# Patient Record
Sex: FEMALE | Race: WHITE
[De-identification: names, ages, dates, MRNs, and addresses within clinical notes are randomized per-mention and may not be internally consistent; named-entity substitution may affect disease eponyms.]

---

## 2017-01-03 ENCOUNTER — HOSPITAL ENCOUNTER (INPATIENT)
Dept: HOSPITAL 65 - ER | Age: 57
LOS: 3 days | Discharge: HOME | DRG: 419 | End: 2017-01-06
Attending: INTERNAL MEDICINE | Admitting: INTERNAL MEDICINE
Payer: COMMERCIAL

## 2017-01-03 VITALS — SYSTOLIC BLOOD PRESSURE: 121 MMHG | DIASTOLIC BLOOD PRESSURE: 77 MMHG

## 2017-01-03 VITALS — BODY MASS INDEX: 20.06 KG/M2 | HEIGHT: 64 IN | WEIGHT: 117.5 LBS

## 2017-01-03 DIAGNOSIS — E78.00: ICD-10-CM

## 2017-01-03 DIAGNOSIS — K29.70: ICD-10-CM

## 2017-01-03 DIAGNOSIS — Z80.0: ICD-10-CM

## 2017-01-03 DIAGNOSIS — K21.9: ICD-10-CM

## 2017-01-03 DIAGNOSIS — K80.10: Primary | ICD-10-CM

## 2017-01-03 DIAGNOSIS — F17.210: ICD-10-CM

## 2017-01-03 DIAGNOSIS — M54.9: ICD-10-CM

## 2017-01-03 DIAGNOSIS — Z79.899: ICD-10-CM

## 2017-01-03 DIAGNOSIS — E83.42: ICD-10-CM

## 2017-01-03 DIAGNOSIS — E87.6: ICD-10-CM

## 2017-01-03 DIAGNOSIS — Z78.0: ICD-10-CM

## 2017-01-03 DIAGNOSIS — I10: ICD-10-CM

## 2017-01-03 DIAGNOSIS — Z82.49: ICD-10-CM

## 2017-01-03 LAB
ALP INTEST CFR SERPL: 93 U/L (ref 50–136)
ALT SERPL-CCNC: 41 U/L (ref 12–78)
APPEARANCE UR: CLEAR
AST SERPL-CCNC: 76 U/L (ref 0–35)
BASOPHILS # BLD AUTO: 0 10^3/UL (ref 0–0.1)
BASOPHILS NFR BLD AUTO: 0.2 % (ref 0–0.2)
BILIRUB UR STRIP.AUTO-MCNC: NEGATIVE MG/DL
CALCIUM SERPL-MCNC: 8.2 MG/DL (ref 8.4–10.5)
CO2 BLDA-SCNC: 28.7 MMOL/L (ref 20–32)
COLOR UR: YELLOW
EOSINOPHIL # BLD AUTO: 0.1 10^3/UL (ref 0–0.2)
EOSINOPHIL NFR BLD AUTO: 0.8 % (ref 0–5)
ERYTHROCYTE [DISTWIDTH] IN BLOOD BY AUTOMATED COUNT: 12.8 % (ref 11.5–14.5)
GLUCOSE PRE 100 G GLC PO SERPL-MCNC: 139 MG/DL (ref 70–110)
HGB BLD-MCNC: 12.9 G/DL (ref 12–15)
LYMPHOCYTES # BLD AUTO: 1.3 10^3/UL (ref 1–4.8)
LYMPHOCYTES NFR BLD AUTO: 12.5 % (ref 24–44)
MCH RBC QN AUTO: 31.9 PG (ref 26–34)
MCHC RBC AUTO-ENTMCNC: 34.3 G/DL (ref 33–37)
MCV RBC AUTO: 92.8 FL (ref 78–100)
MONOCYTES # BLD AUTO: 1 10^3/UL (ref 0.3–0.8)
MONOCYTES NFR BLD AUTO: 9.3 % (ref 5–12)
NEUTROPHILS # BLD AUTO: 8.2 10^3/UL (ref 1.8–7.7)
NEUTROPHILS NFR BLD AUTO: 77.1 % (ref 41–85)
PLATELET # BLD AUTO: 271 10^3/UL (ref 150–400)
PMV BLD AUTO: 10 FL (ref 7.8–11)
UROBILINOGEN UR QL STRIP.AUTO: NORMAL
WBC # BLD AUTO: 10.6 10^3/UL (ref 4.5–11)
WBC,URINE: (no result) WBC/HPF (ref 0–2)

## 2017-01-03 PROCEDURE — 93307 TTE W/O DOPPLER COMPLETE: CPT

## 2017-01-03 PROCEDURE — 83880 ASSAY OF NATRIURETIC PEPTIDE: CPT

## 2017-01-03 PROCEDURE — 81000 URINALYSIS NONAUTO W/SCOPE: CPT

## 2017-01-03 PROCEDURE — 99285 EMERGENCY DEPT VISIT HI MDM: CPT

## 2017-01-03 PROCEDURE — 86677 HELICOBACTER PYLORI ANTIBODY: CPT

## 2017-01-03 PROCEDURE — G0378 HOSPITAL OBSERVATION PER HR: HCPCS

## 2017-01-03 PROCEDURE — 83735 ASSAY OF MAGNESIUM: CPT

## 2017-01-03 PROCEDURE — 85025 COMPLETE CBC W/AUTO DIFF WBC: CPT

## 2017-01-03 PROCEDURE — 80053 COMPREHEN METABOLIC PANEL: CPT

## 2017-01-03 PROCEDURE — 36415 COLL VENOUS BLD VENIPUNCTURE: CPT

## 2017-01-03 PROCEDURE — 84484 ASSAY OF TROPONIN QUANT: CPT

## 2017-01-03 PROCEDURE — 80048 BASIC METABOLIC PNL TOTAL CA: CPT

## 2017-01-03 PROCEDURE — 43239 EGD BIOPSY SINGLE/MULTIPLE: CPT

## 2017-01-03 PROCEDURE — 85730 THROMBOPLASTIN TIME PARTIAL: CPT

## 2017-01-03 PROCEDURE — 83690 ASSAY OF LIPASE: CPT

## 2017-01-03 PROCEDURE — 76705 ECHO EXAM OF ABDOMEN: CPT

## 2017-01-03 PROCEDURE — 96374 THER/PROPH/DIAG INJ IV PUSH: CPT

## 2017-01-03 PROCEDURE — 74176 CT ABD & PELVIS W/O CONTRAST: CPT

## 2017-01-03 PROCEDURE — 85610 PROTHROMBIN TIME: CPT

## 2017-01-03 PROCEDURE — 82550 ASSAY OF CK (CPK): CPT

## 2017-01-03 PROCEDURE — 93005 ELECTROCARDIOGRAM TRACING: CPT

## 2017-01-03 RX ADMIN — POTASSIUM CHLORIDE AND SODIUM CHLORIDE SCH MLS/HR: 450; 150 INJECTION, SOLUTION INTRAVENOUS at 22:50

## 2017-01-03 NOTE — PRM.ACF1
Admission Criteria Forms


                                                                         


                           ABDOMINAL PAIN





Clinical Indications for Admission to Inpatient Care


 


                                                                               

      (Place 'X' for any and all applicable criteria):





Admission is indicated for ANY ONE of the following(1)(2)(3)(4)(5):


[X]I.      Inpatient admission required rather than observation care (Also use 

Abdominal Pain: Observation Care, 


           as appropriate) because of ANY ONE of the following:


            [ ]a)   Severe pain requiring acute inpatient management


            [ ]b)    Identification of etiology/finding that requires inpatient 

care (eg, aortic dissection, free air)


            [ ]c)    Absent bowel sounds with complete ileus(6)  


            [ ]d)    Suspected toxic megacolon


            [X]e)    Severe electrolyte abnormalities requiring inpatient care


            [ ]f)     High fever or infection requiring inpatient admission as 

indicated by ANY ONE of following(7)(8):


                       [ ] i)    Appropriate outpatient or observational care 

antimicrobial treatment unavailable, not effective, or not feasible


                       [ ] ii)   Documented bacteremia 


                       [ ] iii)  Temperature > 104.9 degrees F (oral)


                       [ ] iv)  T >103.1 F (oral) or < 96.8 F(rectal) that does 

not respond to all emergency treatment measures


            [ ]g)     Signs of intestinal obstruction [B] 


            [ ]h)     Hemodynamic instability


            [ ]i)      IV fluid to replace significant ongoing losses (greater 

than 3 L/m2 per day) (12)(13)


            [ ]j)      Percutaneous or open drainage (eg, abscess, biliary tract

) procedures


            [ ]k)     Parenteral nutrition regimen that must be implemented on 

inpatient basis   


            [ ]l)      Other condition,treatment or monitoring requiring 

inpatient admission.


[ ]II.      Peritoneal signs present


[ ]III.     Surgery needed that cannot be performed on an ambulatory basis.


[ ]IV.    Evaluation requires patient to not eat or drink for extended period (

eg, more than 24 hours).





[ ]V.     Contraindications and/or Inappropriate clinical situations for 

Observational Care in patients with


           abdominal pain, when ANY ONE of the following is required:


           [ ]a)  Thorough evaluation is required to prevent catastrophic 

events due to delays in diagnosing  


                   (e.g.Mesenteric ischemia) 1,3


           [ ]b)  Patient with severe pathology or with chronic symptoms 

unlikely to improve in the ED stay (3)


[ ]VI.    General contraindications and/or Inappropriate clinical situations 

for Observational Care in patients


           with abdominal pain, when ANY ONE of the following is required:


           [ ]a)   Prediction of prolongation of LOS based on ANY ONE of the 

following may be considered as 


                    a contraindication for observational care 2, 3, 4, 5, 6, 7, 

8, 9, 10, 11


                    [ ]i)    Age > 65 yrs.


                    [ ]ii)   Patient arriving by ambulance


                    [ ]iii)  Patient with high acuity


                    [ ]iv)  Patient requiring vital sign monitoring


                    [ ]v)   Patient on IV medication


           [ ]b)   Systolic blood pressures  180mmHg 3,12


           [ ]c)   Patient with altered mental status including delirium and 

other alteration of consciousness, (3)


           [ ]d)   Patient whose discharge disposition will be to a skilled 

nursing home or rehabilitation home should 


                    not be managed in Emergency Department Observation Unit. 

CMS rule requires 3 days hospital stay before such placement.3,13


           [ ]e)   Patient with failure to thrive due to broad array of 

etiologies 3,16,17


           [ ]f)    Inability to ambulate 3,14








Extended stay beyond goal length of stay may be needed for(2)(3):


[ ]a)   Persistent abdominal pain with suspected intra-abdominal process


[ ]b)   Diagnosed condition requiring continued stay (e.g., pancreatitis, 

complicated diverticulitis)                                            


[ ]c)   Surgery (e.g., colectomy)                


    





The original [facility] content created by [email or web address] has been revised. 


The portions of the content which have been revised are identified through the 

use of italic text or in bold, and [facility] 


has neither reviewed nor approved the modified material.All other unmodified 

content is copyright  [facility].





Please see references footnoted in the original [facility] edition 

2016








AJIT HAYWOOD CDS Aleksandr 3, 2017 17:00


ADILENE BELLO MD Aleksandr 3, 2017 17:27

## 2017-01-03 NOTE — ER.PDOC
General


Chief Complaint:  Abdomen Pain


Stated Complaint:  ABD PAIN


Time seen by MD:  15:40


Source:  patient


Exam Limitations:  no limitations





History of Present Illness


Initial Comments


Mid epigastric pain associated with N/V/D for 5 days. Saw urgent care this 

weekend and was given Zofran which has been helpful.


Timing/Duration:  intermittent, other (5 days)


Severity/Quality:  mild, cramping


Radiation:  back


Associated Symptoms:  back pain, diarrhea, heartburn, nausea/vomiting


Exacerbated by:  food


Relieved By:  nothing


Allergies:  


Coded Allergies:  


     No Known Allergies (Unverified , 1/3/17)


Home Meds


Reported Medications


Esomeprazole Magnesium (Nexium)40 Mg Capsule.dr1 Cap PO DAILY #90 CAP  Ref 3


   1/3/17


Estrogens, Conjugated (Premarin)1.25 Mg Tablet1 Tab PO DAILY #90 TAB  Ref 3


   1/3/17


Losartan/Hydrochlorothiazide (Losartan-Hctz 50-12.5 Mg Tab)1 Each Tablet1 Tab 

PO DAILY #90 TAB  Ref 3


   1/3/17


Vital Signs





First Vital Signs








  Date Time  Temp Pulse Resp B/P Pulse Ox O2 Delivery O2 Flow Rate FiO2


 


1/3/17 15:10 98.7 80 20  99   


 


1/3/17 15:14    120/77    








Last Vital Signs








  Date Time  Temp Pulse Resp B/P Pulse Ox O2 Delivery O2 Flow Rate FiO2


 


1/3/17 15:14 98.7 80 20 120/77 99   











Past Medical History


Medical History:  high cholesterol


Surgical History:  no surgical history


LMP (females 10-50):  postmenopause





Family History


Significant Family History:  heart disease (Maternal aunt and uncle), cancer (

Pancreatic in father)





Social History


Smoking:  cigarettes, greater than 1 pack/day


Alcohol Use:  none


Drug Use:  none





Constitutional:   no symptoms reported


EENTM:   no symptoms reported


Respiratory:   no symptoms reported


Cardiovascular:   no symptoms reported


Gastrointestinal:   abdominal pain diarrhea nausea poor appetite vomiting


Genitourinary:   no symptoms reported


Musculoskeletal:   back pain


Skin:   no symptoms reported


Psychiatric/Neurological:   no symptoms reported


Endocrine:   no symptoms reported


Hematologic/Lymphatic:   no symptoms reported


All Other Systems:  Reviewed and Negative





Physical Exam


General Appearance:  WD/WN, Mild Distress, Thin


HEENT:  PERRL/EOMI


Neck:  Non-Tender, Full Range of Motion, Supple, Normal Inspection


Respiratory:  chest non-tender, lungs clear, normal breath sounds, no 

respiratory distress


Cardiovascular:  Normal Peripheral Pulses, Regular Rate, Rhythm, No Edema, No 

Gallop, No JVD, No Murmur


Gastrointestinal:  Normal Bowel Sounds, Soft, Tenderness (mid epigastric)


Back:  Normal Inspection, No CVA Tenderness


Extremities:  Normal Range of Motion, Non-Tender, Normal Inspection, No Pedal 

Edema, No Calf Tenderness, Normal Capillary Refill


Neurologic/Psychiatric:  CNs II-XII NML as Tested, No Motor/Sensory Deficits, 

Alert, Normal Mood/Affect, Oriented x 3


Skin:  Normal Color, Warm/Dry, Cyanosis





Progress


Progress


Abdominal pain continues, but 1/10 and patient refusing any need for pain 

medication. Understands the need for admission due to hypokalemia.





EKG/XRAY/CT/US


EKG:  NSR


CT Comments:  Abdomen: gallbladder sludge, distended stomach





Consult/PCP


Time Consult/PCP Called:  17:05


Consult/PCP:  Dr. Moses


Reason/Comments:  Admission Request, accepted admission





Course


Blood Pressure Systolic:  120


Blood Pressure Diastolic:  77


Blood Pressure Mean:  91





Departure


Time of Disposition:  17:09


Disposition:  09 ADMITTED AS INPATIENT


Impression:  


 Primary Impression:  


 Hypokalemia, gastrointestinal losses


 Additional Impression:  


 Acute gastroenteritis


Condition:  Stable


Referrals:  


MK RICHARDSON MD (PCP)


PRIMARY CARE PROVIDER





Additional Instructions:


Will be admitted to Dr. Moses under obs.





Problem Qualifiers








TOMI BURCH MD Aleksandr 3, 2017 15:48


ADILENE BELLO MD Aleksandr 3, 2017 17:18

## 2017-01-03 NOTE — DIREP
PROCEDURE:CT ABD/PELVIS WITHOUT CONTRAST

 

TECHNIQUE:No oral contrast was given.  Axial cuts were obtained from the dome 

of the diaphragm to the ischial tuberosities.  No intravenous contrast was 

given.  The images were viewed at lung and soft tissue settings.  Sagittal and 

coronal reconstructions are provided.  

 

COMPARISON:None.

 

INDICATIONS:Epigastric Abdominal Pain

 

FINDINGS:

LOWER CHEST:No infiltrate or pleural effusion.

LIVER:Normal.

BILIARY:Distended gallbladder with tiny layering stones or sludge.  No biliary 

duct dilatation.

PANCREAS:Normal.

SPLEEN:Normal.

URINARY TRACT:No renal or ureteral stone, obstruction or perinephric 

inflammation.  Unremarkable urinary bladder.

ADRENALS:Normal.

AORTA/VASCULAR:Aortoiliac calcification without dilatation.

RETROPERITONEUM:Normal.

BOWEL/MESENTERY:Large volume of fluid and food material in the stomach.  No 

obvious mass, inflammatory stranding, free fluid or air.  Nonvisualized 

appendix.

ABDOMINAL WALL:Normal.

PELVIS:Hysterectomy.

BONES:L5/S1 disc degenerative changes.

OTHER:Normal.

 

CONCLUSION:

1.  Distended gallbladder containing layering gravel or sludge.

2.  Prominent gastric distention may indicate gastroparesis or outlet 

obstruction.

 

 

 

Dictated by: Leela De La Torre MD on 01/03/2017 at 04:12 PM     

Electronically Signed By: Leela De La Torre MD on 01/03/2017 at 04:18 PM

## 2017-01-03 NOTE — NUR
ARRIVAL

PATIENT ARRIVED TO ED4 AMBULATORY, C/O OF EPIGASTRIC PAIN TODAY, WAS SEEN 
RECENTLY IN THE URGENT CARE FOR NAUSEA AND VOMITING, SENT HOME WITH ZOFRAN, DID 
TAKE IT FOR A COUPLE OF DAY STARTED TO FEEL BETTER, BUT EPIGASTRIC PAIN 
CONTINUED AFTER EATING STEW. DECIDED TO TAKE ZOFRAN AND COME TO ED FOR FURTHER 
EVAL BY EDP. NO DISTRESS NOTED.

## 2017-01-03 NOTE — HPH
ADMIT DATE:  01/03/2017



The patient is being placed under observation to Med-Surg.



PRIMARY CARE PHYSICIAN:  Bc Calhoun MD



ADMITTING DIAGNOSES:

1. Nausea, vomiting with right upper quadrant and epigastric pains and loose

stools.

2. Hypokalemia with hypomagnesemia.

3. Hypertension with GERD and postmenopausal.



CHIEF COMPLAINT:  "I threw up and had loose stools and had belly pain."



HISTORY OF PRESENT ILLNESS:  The patient is a very pleasant 56-year-old female

with known hypertension and GERD who started off waking up Friday, which was

approximately 4 days ago with nausea and nonbilious vomiting.  She had vomited

up her food the night before.  She cannot recall what she ate on Thursday night

though and this progressed throughout Friday to the point where she was having 3

episodes of loose stool.  She took some Tums and over-the-counter antacids.  She

took her Nexium that normally helps her with her reflux and the pain subsided

throughout the day.  The next day she did fine and the following day she did

fine up until yesterday when she started to have increasing nausea and

nonbilious vomiting again.  No diarrhea.  No loose stools reported at this time.

 She had increasing pains in her upper abdomen and this is what prompted her to

go to the ER today.  No recent travel noted.  She has not eaten anything out of

the ordinary.  She did remark that 2 days ago that she ate some pot stew and

after that she felt very nauseated, had abdominal pains, so both times it was

after she was eating.  She denies any fevers, no night sweats, no syncope, no

lethargy.  No dysuria.  No trauma reported.



PAST MEDICAL HISTORY:  Hypertension for about 4 years, GERD for many years and

she is postmenopausal.



PAST SURGICAL HISTORY:  She had a scope done 20 years ago and said that she had

esophagitis at that time.



ALLERGIES:  NO KNOWN DRUG ALLERGIES.



MEDICATIONS:  She is on include Nexium, Premarin and losartan/HCT, 50/12.5 mg

daily.



SOCIAL HISTORY:  She smokes about a pack a day.  No illicit drugs, no alcohol

reported.



FAMILY HISTORY:  Asked and noncontributory for this admission.



PHYSICAL EXAMINATION:

VITAL SIGNS:  When she came in, temperature is 98.7, pulse rate 80, respirations

20, blood pressure 120/77 and O2 sats of 99% on room air.

My physical exam is as follows:

GENERAL:  She is in no acute distress to me, awake and alert, oriented x 4.

HEENT:  Oropharynx is clear.  Moist mucous membranes noted.

NECK:  Supple, no JVD, no bruits.

HEART:  S1, S2 audible.  She was not tachycardic.

LUNGS:  Clear bilaterally.  She is not tachypneic.

ABDOMEN:  Bowel sounds are present.  She was tender to the right upper quadrant

and epigastrium.  No rebound, no guarding noted.  No masses felt.  No CVA

tenderness on her back.  No edema to her lower extremities.  No petechia, no

purpura.  2+ distal pulses are noted.



LABORATORY DATA:  Labs were drawn.  CBC was normal.  Coags were normal.  Urine

was normal.  H. pylori was negative.  Chemistry panel showed potassium 2.5, mag

of 1.7, AST is 76.  CK was elevated to 326, CK-MB elevated at 7.2, but troponin

I was less than 0.02.  Lipase was normal.



IMAGING STUDIES:  She had a CAT scan of the abdomen and pelvis without contrast

that just showed distended gallbladder containing _____ sludge, prominent

gastric distention.



ASSESSMENT:  We have this female with nausea, vomiting with loose stools, right

upper quadrant pain and epigastric pain with underlying hypertension.  EKG shows

some LV strain, but she is not having any cardiac symptoms currently.  I am

concerned about a gastritis picture versus peptic ulcer disease versus

gallbladder disease.  I will continue her home medications, which includes PPI

for stomach.  I will get a right upper quadrant ultrasound to look at the

gallbladder tomorrow and get an echo to look at the heart as well.  I will draw

some serial cardiac enzymes on her and see how she will do by tomorrow.





______________________________

Aura Moses MD



DR:  TETE/melina  JOB# [id number]  [id number]

DD:  01/03/2017 19:28  DT:  01/03/2017 21:57

## 2017-01-04 VITALS — DIASTOLIC BLOOD PRESSURE: 93 MMHG | SYSTOLIC BLOOD PRESSURE: 141 MMHG

## 2017-01-04 VITALS — SYSTOLIC BLOOD PRESSURE: 140 MMHG | DIASTOLIC BLOOD PRESSURE: 96 MMHG

## 2017-01-04 VITALS — DIASTOLIC BLOOD PRESSURE: 82 MMHG | SYSTOLIC BLOOD PRESSURE: 133 MMHG

## 2017-01-04 VITALS — SYSTOLIC BLOOD PRESSURE: 130 MMHG | DIASTOLIC BLOOD PRESSURE: 88 MMHG

## 2017-01-04 VITALS — DIASTOLIC BLOOD PRESSURE: 78 MMHG | SYSTOLIC BLOOD PRESSURE: 129 MMHG

## 2017-01-04 LAB
CALCIUM SERPL-MCNC: 7.9 MG/DL (ref 8.4–10.5)
CO2 BLDA-SCNC: 28 MMOL/L (ref 20–32)
GLUCOSE PRE 100 G GLC PO SERPL-MCNC: 97 MG/DL (ref 70–110)

## 2017-01-04 RX ADMIN — POTASSIUM CHLORIDE AND SODIUM CHLORIDE SCH MLS/HR: 450; 150 INJECTION, SOLUTION INTRAVENOUS at 21:39

## 2017-01-04 RX ADMIN — LOSARTAN POTASSIUM SCH MG: 50 TABLET, FILM COATED ORAL at 16:32

## 2017-01-04 RX ADMIN — PANTOPRAZOLE SODIUM SCH MG: 40 TABLET, DELAYED RELEASE ORAL at 09:00

## 2017-01-04 RX ADMIN — ESTROGENS, CONJUGATED SCH MG: 0.62 TABLET, FILM COATED ORAL at 09:00

## 2017-01-04 RX ADMIN — LOSARTAN POTASSIUM SCH MG: 50 TABLET, FILM COATED ORAL at 09:00

## 2017-01-04 RX ADMIN — HYDROCHLOROTHIAZIDE SCH MG: 12.5 CAPSULE ORAL at 09:00

## 2017-01-04 RX ADMIN — POTASSIUM CHLORIDE AND SODIUM CHLORIDE SCH MLS/HR: 450; 150 INJECTION, SOLUTION INTRAVENOUS at 10:51

## 2017-01-04 NOTE — NUR
REPORT

REPORT RECEIVED ON PT. THIS NURSE ASSUMED CARE AT THIS TIME. PT CONTINUES TO BE NPO, 
AWAITING GALLBLADDER SONO.

## 2017-01-04 NOTE — NUR
DISCHARGE PLAN

VISITED WITH PATIENT AND FAMILY FRIEND CONCERNING DISCHARGE PLAN AND NEED. PATIENT STATED 
SHE LIVES @ HOME WITH , IS INDEPENDENT ON ADLS AND DRIVES DAILY. CM ADDRESSED PATIENT 
SAFETY HANDOUT, NO QUESTIONS ASKED AND VOICED UNDERSTANDING. PATIENT VOICED CONCERN 
REGARDING TIME OF GALLBLADDER US VERSES NPO STATUS. REINFORCEMENT EDUCATION GIVEN TO PATIENT 
REGARDING HER GALLBLADDER US VERSES NPO STATUS WITH VERBAL UNDERSTANDING AND TEACH BACK BY 
PATIENT. CM ALSO VERIFIED TIME WITH US DEPARTMENT, STATED PATIENT WAS NEXT. CM INFORMED 
PATIENT WITH VERBAL UNDERSTANDING, STATED "OKAY THANK YOU FOR CHECKING I WOULD JUST REALLY 
LIKE A CUP OF COFFEE, MY NURSE PROMISED COFFEE AS SOON AS I WAS DONE WITH MY US." BETHEL HOGUE RN CHARGE NURSE NOTIFIED OF PATIENTS COFFEE REQUEST S/P US. CURRENT GOAL FOR PATIENT IS TO 
RETURN HOME WITH  TO ROUTINE SELF CARE UPON DISCHARGE. CONTACT INFORMATION PROVIDED. 
NO FURTHER CM OR DISCHARGE NEEDS KNOWN @ THIS TIME.

## 2017-01-04 NOTE — DIREP
PROCEDURE:US ABDOMEN LIMITED

 

COMPARISON:None.

 

INDICATIONS:RUQ pain with N/V, DISTENDED GB

 

FINDINGS:

 

PANCREAS:Partially obscured by overlying bowel gas

LIVER:Normal hepatic parenchymal architecture.   Normal, hepatopetal flow in 

the portal vein.  

GALLBLADDER:Echogenic material inferior with sludge and shadowing stones. 

Gallbladder wall measures 4 mm

BILIARY:4 mm common bile duct.  There is no biliary ductal dilatation.  

RIGHT KIDNEY:11.2 cm.  Normal. No hydronephrosis.  

OTHER:Negative.  No ascites is identified.  

 

CONCLUSION:

1. Cholelithiasis. Wall thickening suggest cholecystitis.

 

 

 

 

Dictated by: Mariusz Glasgow Jr. on 01/04/2017 at 12:21 PM     

Electronically Signed By: Mariusz Glasgow Jr. on 01/04/2017 at 12:25 PM

## 2017-01-04 NOTE — NUR
Assessment completed as charted, see flowsheet for further notes. Pt up ambulating hallway 
frequently. Pt denies c/o pain. Does verbalize some concern over impending surgery in AM, 
but appears overall comfortable with what will happen. Shortly discussed this with patient 
during this time. Spouse at bedside. Pt denies further needs at this time. States she does 
want an HS snack before MN after which she will be NPO. Snack will be provided. Will 
continue to monitor closely.

## 2017-01-04 NOTE — PRM.PN
Subjective


Pt doing ok; visited by Dr. Daniel and is in good spirits


Patient History:  


Patient reports no known family medical history.





VTE


VTE Risk Total Score:  1


VTE Risk Score


VTE Risk:


Score 0-1 = Low Risk


(Aggressive mobilization; early ambulation; no VTE prophylaxis required)


Score 2: Moderate Risk


(Intermittent/Pneumatic Compression Device OR Lovenox/Heparin/Coumadin)


Score 3-4: High Risk


(Intermittent/Pneumatic Compression Device AND Lovenox/Heparin/Coumadin)


Score  > or =5: Highest Risk


(Intermittent/Pneumatic Compression Device AND Lovenox/Heparin/Coumadin)


Antico:Hep/LMWH/Coum/Xarelto:  Yes


Mechanical device ordered:  Yes





Review of Systems


Constitutional:  No: Chills, Fever, Sweats, Weakness


Eyes:  No: Conjunctivae inflammation, Eyelid inflammation, Pain, Vision change


ENT:  No: Ear discharge, Ear pain, Nose congestion, Nose discharge, Nose pain


Respiratory:  No: Cough, Dry, SOB with excertion, Shortness of breath, Wheezing


Cardiovascular:  No: Chest Pain, Edema, Orthopnea, Paroxysmal Noc. Dyspnea


Gastrointestinal:  : Abdominal PainNo: Nausea, Vomiting


Genitourinary:  No Dysuria, No Frequency, No Incontinence


Musculoskeletal:  No: arm pain, back pain, neck pain, other, shoulder pain


Skin:  No: Bruising, Jaundice, Lesions, Rash


Neurological:  No: Change in speech, Confusion, Incoordination, Weakness


Allergies:  


Coded Allergies:  


     No Known Allergies (Unverified , 1/3/17)


Scheduled


Esomeprazole Magnesium (Nexium) 1 CAP PO DAILY (Reported) 


Estrogens, Conjugated (Premarin) 1 TAB PO DAILY (Reported) 


Losartan/Hydrochlorothiazide (Losartan-Hctz 50-12.5 Mg Tab) 1 TAB PO DAILY (

Reported) 





Objective


Vitals and I/O





 Vital Sign - Last 24 Hours








 1/4/17 1/4/17 1/4/17 1/4/17





 00:10 04:46 05:43 09:00


 


Temp 97.8 98.0  


 


Pulse 78 78  


 


Resp 18 18  


 


B/P 133/82 129/78  


 


Pulse Ox 99 97  


 


O2 Delivery Room Air Room Air Room Air Room Air


 


    





 1/4/17 1/4/17 1/4/17 





 10:08 16:28 16:32 


 


Temp 98.0 97.9  


 


Pulse 70 74  


 


Resp 18 18  


 


B/P 130/88 141/93 141/93 


 


Pulse Ox 98 100  


 


O2 Delivery Room Air Room Air  














 Intake and Output   


 


 1/3/17 1/3/17 1/4/17





 15:00 23:00 07:00


 


Intake Total  480 ml 2526 ml


 


Output Total  625 ml 400 ml


 


Balance  -145 ml 2126 ml








General:  Alert, Cooperative, No acute distress


HEENT:  Atraumatic, PERRLA, EOMI, Mucous membr. moist/pink


Neck:  Supple, No JVD, No LAD


Lungs:  Clear to auscultation, Normal air movement


Heart:  Normal S1, Normal S2


Abdomen:  Normal bowel sounds, Soft, No tenderness


Extremities:  No clubbing, No cyanosis, No edema


Skin:  No rashes, No breakdown


Neuro:  Normal speech


Psych/Mental Status:  Mental status NL, Mood NL


Medication Reconciliation


Scheduled


Esomeprazole Magnesium (Nexium) 1 CAP PO DAILY (Reported) 


Estrogens, Conjugated (Premarin) 1 TAB PO DAILY (Reported) 


Losartan/Hydrochlorothiazide (Losartan-Hctz 50-12.5 Mg Tab) 1 TAB PO DAILY (

Reported) 





Assessment/Plan


57 yo female with cholelithiasis, abd pains, resolved hypokalemia and 

hypomagnesemia





- to get lap owen and EGD tomorrow


- cont IVF


- ambulate


- follow clinically


Problems:  


Patient History:  


Patient reports no known family medical history.








MIKE NATHAN MD Jan 4, 2017 21:10

## 2017-01-05 VITALS — DIASTOLIC BLOOD PRESSURE: 88 MMHG | SYSTOLIC BLOOD PRESSURE: 154 MMHG

## 2017-01-05 VITALS — SYSTOLIC BLOOD PRESSURE: 151 MMHG | DIASTOLIC BLOOD PRESSURE: 90 MMHG

## 2017-01-05 VITALS — SYSTOLIC BLOOD PRESSURE: 149 MMHG | DIASTOLIC BLOOD PRESSURE: 90 MMHG

## 2017-01-05 VITALS — SYSTOLIC BLOOD PRESSURE: 166 MMHG | DIASTOLIC BLOOD PRESSURE: 96 MMHG

## 2017-01-05 VITALS — DIASTOLIC BLOOD PRESSURE: 77 MMHG | SYSTOLIC BLOOD PRESSURE: 136 MMHG

## 2017-01-05 VITALS — DIASTOLIC BLOOD PRESSURE: 82 MMHG | SYSTOLIC BLOOD PRESSURE: 158 MMHG

## 2017-01-05 VITALS — DIASTOLIC BLOOD PRESSURE: 70 MMHG | SYSTOLIC BLOOD PRESSURE: 153 MMHG

## 2017-01-05 VITALS — DIASTOLIC BLOOD PRESSURE: 92 MMHG | SYSTOLIC BLOOD PRESSURE: 140 MMHG

## 2017-01-05 VITALS — SYSTOLIC BLOOD PRESSURE: 147 MMHG | DIASTOLIC BLOOD PRESSURE: 60 MMHG

## 2017-01-05 PROCEDURE — 0FT44ZZ RESECTION OF GALLBLADDER, PERCUTANEOUS ENDOSCOPIC APPROACH: ICD-10-PCS | Performed by: SURGERY

## 2017-01-05 PROCEDURE — 0DB68ZX EXCISION OF STOMACH, VIA NATURAL OR ARTIFICIAL OPENING ENDOSCOPIC, DIAGNOSTIC: ICD-10-PCS | Performed by: SURGERY

## 2017-01-05 RX ADMIN — PANTOPRAZOLE SODIUM SCH MG: 40 TABLET, DELAYED RELEASE ORAL at 09:00

## 2017-01-05 RX ADMIN — Medication PRN EACH: at 14:36

## 2017-01-05 RX ADMIN — Medication PRN EACH: at 22:50

## 2017-01-05 RX ADMIN — POTASSIUM CHLORIDE AND SODIUM CHLORIDE SCH MLS/HR: 450; 150 INJECTION, SOLUTION INTRAVENOUS at 19:30

## 2017-01-05 RX ADMIN — HYDROCHLOROTHIAZIDE SCH MG: 12.5 CAPSULE ORAL at 09:00

## 2017-01-05 RX ADMIN — POTASSIUM CHLORIDE AND SODIUM CHLORIDE SCH MLS/HR: 450; 150 INJECTION, SOLUTION INTRAVENOUS at 09:00

## 2017-01-05 RX ADMIN — ESTROGENS, CONJUGATED SCH MG: 0.62 TABLET, FILM COATED ORAL at 09:00

## 2017-01-05 NOTE — NUR
Assessment completed as charted, see flowsheet for further notes. Pt states she has been 
trying to "Ambulate and Hydrate" like the doctor instructed her too. Teaching completed, see 
teaching record. Pt denies further needs at this time. Will continue to monitor closely. 
Call light within reach.

## 2017-01-05 NOTE — PRM.PN
Subjective


Pt doing good after surgery/scope


Patient History:  


Patient reports no known family medical history.





VTE


VTE Risk Total Score:  1


VTE Risk Score


VTE Risk:


Score 0-1 = Low Risk


(Aggressive mobilization; early ambulation; no VTE prophylaxis required)


Score 2: Moderate Risk


(Intermittent/Pneumatic Compression Device OR Lovenox/Heparin/Coumadin)


Score 3-4: High Risk


(Intermittent/Pneumatic Compression Device AND Lovenox/Heparin/Coumadin)


Score  > or =5: Highest Risk


(Intermittent/Pneumatic Compression Device AND Lovenox/Heparin/Coumadin)


Antico:Hep/LMWH/Coum/Xarelto:  Yes


Mechanical device ordered:  Yes





Review of Systems


Constitutional:  No: Chills, Fever, Sweats, Weakness


Eyes:  No: Conjunctivae inflammation, Eyelid inflammation, Pain, Vision change


ENT:  No: Ear discharge, Ear pain, Nose congestion, Nose discharge, Nose pain


Respiratory:  No: Cough, Dry, SOB with excertion, Shortness of breath, Wheezing


Cardiovascular:  No: Chest Pain, Edema, Orthopnea, Paroxysmal Noc. Dyspnea


Gastrointestinal:  : Abdominal PainNo: Nausea, Vomiting


Genitourinary:  No Dysuria, No Frequency, No Incontinence


Musculoskeletal:  No: arm pain, back pain, neck pain, other, shoulder pain


Skin:  No: Bruising, Jaundice, Lesions, Rash


Neurological:  No: Change in speech, Confusion, Incoordination, Weakness


Allergies:  


Coded Allergies:  


     No Known Allergies (Unverified , 1/3/17)


Scheduled


Esomeprazole Magnesium (Nexium) 1 CAP PO DAILY (Reported) 


Estrogens, Conjugated (Premarin) 1 TAB PO DAILY (Reported) 


Losartan/Hydrochlorothiazide (Losartan-Hctz 50-12.5 Mg Tab) 1 TAB PO DAILY (

Reported) 





Objective


Vitals and I/O





 Vital Sign - Last 24 Hours








 1/4/17 1/4/17 1/5/17 1/5/17





 20:05 20:05 04:00 07:42


 


Temp 97.0  97.7 


 


Pulse 75  83 


 


Resp 18  18 


 


B/P 140/96  136/77 


 


Pulse Ox 98  100 


 


O2 Delivery Room Air Room Air  Room Air


 


    





 1/5/17 1/5/17 1/5/17 1/5/17





 08:00 11:39 11:39 11:44


 


Temp 98.0  97.4 


 


Pulse 81  89 103


 


Resp 16  18 16


 


B/P 151/90  140/92 158/82


 


Pulse Ox 98  100 100


 


O2 Delivery Room Air  Hi Con Mask Room Air


 


O2 Flow Rate  10  


 


    





 1/5/17 1/5/17 1/5/17 1/5/17





 11:49 11:54 11:59 12:04


 


Temp 97.6  97.8 


 


Pulse 89 83 75 76


 


Resp 18 18 18 18


 


B/P 154/88 153/70 147/60 149/90


 


Pulse Ox 95 96 96 98


 


O2 Delivery Room Air Room Air Room Air Room Air














 Intake and Output   


 


 1/4/17 1/4/17 1/5/17





 15:00 23:00 07:00


 


Intake Total 900 ml  8094 ml


 


Output Total 400 ml  100 ml


 


Balance 500 ml  7994 ml








General:  Alert, Oriented X3, Cooperative, No acute distress


HEENT:  Atraumatic, PERRLA, EOMI, Mucous membr. moist/pink


Neck:  Supple, No JVD, No thyromegaly, +2 carotid pulse wo bruit


Lungs:  Clear to auscultation, Normal air movement


Heart:  Normal S1, Normal S2


Abdomen:  Normal bowel sounds, Soft


Extremities:  No clubbing, No cyanosis


Skin:  No rashes


Neuro:  Normal gait, Normal speech, Sensation intact


Psych/Mental Status:  Mental status NL, Mood NL


Medication Reconciliation


Scheduled


Esomeprazole Magnesium (Nexium) 1 CAP PO DAILY (Reported) 


Estrogens, Conjugated (Premarin) 1 TAB PO DAILY (Reported) 


Losartan/Hydrochlorothiazide (Losartan-Hctz 50-12.5 Mg Tab) 1 TAB PO DAILY (

Reported) 





Assessment/Plan


57 yo female with cholelithiasis, gastritis, HTN





- increase activity


- D/C planning for tomorrow is stable


- PPI BID


Problems:  


Patient History:  


Patient reports no known family medical history.








MIKE NATHAN MD Jan 5, 2017 19:03

## 2017-01-05 NOTE — NUR
RECEIVED BEDSIDE REPORT POST LAP KAYKAY, MONITORS INTACT, CALL LIGHT IN REACH, HANDRAIL UP 
X2, STATES PAIN IS A 3 ON A SCALE OF 0-10. DENIES OTHER NEEDS AT THIS TIME

## 2017-01-05 NOTE — OPH
DATE OF SURGERY:  01/05/2017



PREOPERATIVE DIAGNOSES:

1.  Cholelithiasis symptoms.

2.  History of GERD.



POSTOPERATIVE DIAGNOSES:

1.  Chronic cholecystitis.

2.  Mild gastritis.



SURGEON:  Jose Daniel DO



ASSISTANT:  OR staff.



ANESTHESIA:  General anesthesia by Ms. Smith, the CRNA plus local used on the

field.



PROCEDURES PERFORMED:

1.  Laparoscopic-assisted cholecystectomy.

2.  Esophagogastroduodenoscopy with biopsy.



SPECIMENS:

1.  Gallbladder to path.

2.  Gastric mucosa to path.



ESTIMATED BLOOD LOSS:  Less than 20 mL for all procedures.



COUNTS:  At the completion of the case, the counts were correct per OR staff.



DESCRIPTION OF PROCEDURE:  The patient is a 56-year-old female known to me from

previous consult.  Prior to procedure, informed consent was obtained.  At time

of procedure, she was taken to the operative suite, placed in supine position. 

After timeout was completed, general anesthesia obtained.  Her abdomen was

prepped and draped in normal fashion.  Local was used to anesthetize the

periumbilical region, incision created and 5 mm trocar was introduced in the

abdomen with Endo camera visualization.  Once in the abdomen, pneumoperitoneum

was induced to level of 14 mmHg.  With camera visualization, a 12 mm trocar was

placed in the epigastrium, 5 mm trocar was placed laterally in the right upper

quadrant and then another 5 mm trocar was placed in the midline of right upper

quadrant.  The gallbladder was retracted and exposed.  Attention was directed

towards the infundibulum.  Careful dissection was made to isolate what appears

to be the cystic duct.  Once the cystic duct is clearly isolated, it was clipped

twice proximally and once distally and divided sharply.  Further dissection was

made to isolate cystic arteries, clipped twice proximally and once distally and

divided sharply.  The gallbladder was then removed from liver bed using

electrocautery.  Once completely removed, it was placed in EndoCatch bag,

removed through the epigastric trocar and passed to backtable.



Trocar was  reinserted and gallbladder fossa was irrigated with sterile saline,

inspected for bleeding.  Any bleeding was identified, was controlled with

electrocautery.  With meticulous hemostasis noted, irrigation and suction and

closure was pursued.



With camera visualization, all 4 trocar sites were localized using Marcaine from

the preoperative phase.  The camera was placed in the superior trocar and the

three inferior trocars were removed with camera visualization.  There was noted

to be no bleeding.  The superior trocars were removed with camera visualization

to trocar tract, there was noted to be no bleeding.  Fascia on the epigastric

incision closed with 0 Vicryl suture.  The four skin incisions closed with 4-0

Monocryl.  The patient is cleaned.  Steri-Strips applied.  Bandage applied. 

Drapes removed.



The patient remains anesthetized.  Esophagogastroduodenoscope was advanced

transorally with pneumoinsufflation distally into second portion of duodenum. 

Once the duodenum was adequately visualized, camera was slowly withdrawn to

facilitate visualization of duodenal bulb and the pylorus.  There was noted to

be minimal gastritis and biopsies are obtained.  The retroflexed maneuver was

performed.  Cardia and fundus were within normal limits.  Camera was reduced

 Hemostasis noted to be excellent.  Stomach was decompressed.  Scope was slowly

withdrawn.  Distal, mid and proximal esophagus were all within normal limits. 

Vocal cords were not visualized as the patient has an endotracheal tube in

place.  The camera was removed.  Procedure was discontinued.



The patient tolerated these procedures well.  There were no acute complications

noted.  At this time, she remains under the care of the Department of

Anesthesia.  Of note, the patient was strongly advised, when she feels better

because she is over the age of 50, never had one that  she have a screening

colonoscopy.





______________________________

Jose Daniel DO



DR:  TIA/melina  JOB# [id number]  [id number]

DD:  01/05/2017 11:38  DT:  01/05/2017 14:01



CC:  Aura EDGAR

## 2017-01-05 NOTE — CNH
DATE OF CONSULTATION:  2017



CHIEF COMPLAINT:  Cholelithiasis with symptoms.



HISTORY OF PRESENT ILLNESS:  This is a 56-year-old female who has had upper

abdominal pain for more than 5 days.  She reports she has had post parandial 

symptoms.  She has had nausea and vomiting.  Her symptoms are not improved with

her chronic PPI use.  She is admitted to Med/Surg for other service of the

hospitalist.  She has had an ultrasound and it is positive for gallbladder wall

thickening and cholelithiasis, changes consistent with cholecystitis.  At the

time of my assessment, she is alert and pleasant.  She reports the pain was

epigastric and in right upper quadrant with posterior radiation, associated

symptoms as above.



PAST MEDICAL HISTORY:  Includes GERD.



PAST SURGICAL HISTORY:  Includes  implants.



ALLERGIES:  NO KNOWN DRUG ALLERGIES.



HOME MEDICATIONS:  Per list.



INPATIENT MEDICATIONS:  Per MAR.



SOCIAL HISTORY:  Positive for smoke tobacco for 30 years, negative for alcohol

or illicit drug use.



FAMILY HISTORY:  Mother is living at age 79 and is relatively healthy.  Father

is  at age 81, pancreatic cancer.



REVIEW OF SYSTEMS:

CONSTITUTIONAL:  No fever, chills or weakness.

ENDOCRINE:  She has no known thyroid disease or diabetes.

CARDIOVASCULAR:  No chest pain or trouble breathing.

PULMONARY:  No dyspnea or cough.

GASTROINTESTINAL:  Positive for GERD and symptoms per HPI.

MUSCULOSKELETAL:  No complaints.

NEUROLOGIC:  No seizures or blackouts.



PHYSICAL EXAMINATION:

VITAL SIGNS:  Temperature is 97.9, pulse 74, blood pressure 141/93, respiratory

rate is 18.

HEENT:  Normocephalic, atraumatic.  Pink mucous membranes.

NECK:  Supple and soft.  Trachea is midline.  She has no JVD or thyromegaly.

HEART:  Has regular rate and rhythm.

LUNGS:  Clear bilaterally.

ABDOMEN:  Bowel sounds positive, soft.  She has minimal tenderness to the upper

abdomen.

EXTREMITIES:  Show positive radial pulse bilaterally.  Positive dorsal pedal

pulses bilaterally.

NEUROLOGIC:  No acute finding.

SKIN AND INTEGUMENT:  Warm and dry.



LABORATORY DATA:  WBC is 10.6, hemoglobin 12.9, platelet count is 271,000. 

Chemistry shows BUN of 8 and creatinine 0.57.  PT is 10.3, PTT is 21.6.  

her weight is 53.5 kilos.



IMAGING STUDIES:  Ultrasound shows cholelithiasis.



SURGICAL ASSESSMENT:

1.  Cholelithiasis, probable cholecystitis symptoms.

2.  History of GERD.



PLAN:

1.  The patient seen and examined.  Chart is reviewed.

2.  Agree with the medical management at this point.  I discussed with the

patient  treatment plan.  We will plan for laparoscopic cholecystectomy and EGD

tomorrow.





______________________________

Jose Daniel DO



DR:  TIA/melina  JOB# [id number]  [id number]

DD:  2017 18:57  DT:  2017 13:46



CC:  Aura Calhoun MD

MTDD

## 2017-01-06 VITALS — SYSTOLIC BLOOD PRESSURE: 156 MMHG | DIASTOLIC BLOOD PRESSURE: 81 MMHG

## 2017-01-06 VITALS — SYSTOLIC BLOOD PRESSURE: 156 MMHG | DIASTOLIC BLOOD PRESSURE: 94 MMHG

## 2017-01-06 VITALS — SYSTOLIC BLOOD PRESSURE: 171 MMHG | DIASTOLIC BLOOD PRESSURE: 97 MMHG

## 2017-01-06 VITALS — SYSTOLIC BLOOD PRESSURE: 142 MMHG | DIASTOLIC BLOOD PRESSURE: 89 MMHG

## 2017-01-06 RX ADMIN — POTASSIUM CHLORIDE AND SODIUM CHLORIDE SCH MLS/HR: 450; 150 INJECTION, SOLUTION INTRAVENOUS at 02:03

## 2017-01-06 NOTE — NUR
r

-------------------------------------------------------------------------------

Addendum: 01/06/17 at 0820 by Linda Adair RN- Paulina GARCIA

-------------------------------------------------------------------------------

Report received from night shift. Assumed care of pt. Pt in bed awake and alert. Pt denies 
pain at this time. Trochar sites x4 noted to pt's abdomen. Bandaids noted to all 4 sites. 
Scant amount of bleeding on bandages noted. Bruising noted posterior to pt's umbilicus. 
Encouraged pt to ambulate and to use IS. Pt verbalized understanding. Call light within 
reach. No other needs determined at this time. Will continue to monitor.

## 2017-01-06 NOTE — NUR
Discharge

Educated pt on care of incision sites at home. Instructed on signs and symptoms of infection 
to monitor and report to Dr. Instructed on follow up appointment with Dr. Daniel and Dr. Calhoun. Pt verbalized understanding. Instructed to follow a low fat diet at home and to not 
lift over 15 lbs. Pt verbalized understanding. No other questions at this time.

## 2017-01-06 NOTE — CNH
DATE OF CONSULTATION:  01/06/2017



SUBJECTIVE:  A 56-year-old female in no acute distress.  She reports she has no

nausea and vomiting, and her abdominal pain is minimal.



OBJECTIVE:

VITAL SIGNS:  Last temperature is 98.2, last pulse 71, respiratory rate of 18,

blood pressure is 171/97.

ABDOMEN:  Bowel sounds are positive, soft.  She has minimal tenderness in the

incisions sites.



ASSESSMENT:

1.  Postoperative day #1, laparoscopic cholecystectomy with cholelithiasis,

cholecystitis.

2.  Postoperative day #1, EGD with biopsy.

3.   hypertension.



PLAN:

1.  The patient seen and examined.  Chart is reviewed.

2.  Increase diet as tolerated.  She will need to follow up with me as an

outpatient to review pathology findings.

3.  Follow up with her PCP.

4.  Continue  care of the hospitalist.





______________________________

Jose Daniel DO



DR:  TIA/melina  JOB# [id number]  [id number]

DD:  01/06/2017 06:08  DT:  01/06/2017 09:45



CC:  Aura Calhoun MD

MTDD

## 2017-01-06 NOTE — NUR
Status

Pt up in hallway ambulating length several times. Pt reports having no pain at this time. Pt 
tolerating ambulation with no apparent problem

## 2017-01-06 NOTE — DSH
DATE OF DISCHARGE:  01/06/2017



ADMITTING DIAGNOSES:

1.  Right upper quadrant pain with nausea and vomiting.

2.  Hypokalemia.

3.  Hypomagnesemia.

4.  Hypertension.

5.  GERD.



DISCHARGE DIAGNOSES:

1.  Cholelithiasis status post laparoscopic cholecystectomy.

2.  Resolved hypokalemia and hypomagnesemia.

3.  Gastritis.

4.  Hypertension.



HOSPITAL COURSE:  The patient is a very pleasant 56-year-old female who came in

with abdominal pains with nausea and vomiting.  Workup did reveal cholelithiasis

and she had low potassium and magnesium levels and I corrected that, the first

night.  I consulted Dr. Daniel and he did a laparoscopic cholecystectomy as well

as an EGD.  The EGD showed a gastritis picture, but no active bleeding.  After

surgery, she has been ambulating and doing fine and tolerating p.o. intake.  She

did have a bowel movement overnight and she feels good this morning.  So she

will be discharged today.  I have asked her to stay on low-fat diet and ambulate

frequently at home, to resume her home medications and she is to follow up with

Dr. Daniel next week and I have asked her to follow up with her PCP, Dr. Calhoun

in 2 weeks' time.  Specifically, I have asked her to continue her Nexium 40 mg a

day for her gastritis picture and she will continue to do this.





______________________________

Aura Moses MD



DR:  TETE/melina  JOB# [id number]  [id number]

DD:  01/06/2017 13:31  DT:  01/06/2017 13:59



CC:  Bc Calhoun MD

## 2017-01-13 NOTE — ECHO
DATE OF SERVICE:  01/04/2017



INDICATIONS:  A 56-year-old lady with cardiac murmur and shortness of breath. 

Echocardiographic study was requested to evaluate structural heart disease,

valvular heart condition and assessment of systolic and diastolic parameters.



FINDINGS:

1. Study quality was good.

2. Underlying rhythm is sinus rhythm.

3. LV systolic function was preserved.  EF around 55-60%.  No wall motion

abnormality.  No LVH, normal dimension of the LV cavity

4. RV size and EF were normal.

5. Both atria were normal in size.

6. Mitral valve was normal in size and function.  No evidence of regurgitation

or insufficiency.  Doppler signal examination across the mitral valve showed

normal diastolic parameters.  Mitral valve mean gradient was 1.6 mmHg ruling out

any significant stenosis.

7. Aortic valve is trileaflet, minimally calcified.  Aortic valve mean velocity

was 0.8 m/sec.  Aortic valve area by VTI method was 2.7 cm2, normal values.

8. Mild tricuspid regurgitation.  Pulmonary artery systolic pressure was between

35-40 mmHg.

9. No pericardial effusion noted.

10. Inferior vena cava was normal in size measuring 1.2 cm.



IMPRESSION:

1. Preserved EF around 60%.  No wall motion abnormality, normal LV dimension.

2. No LVH.

3. Normal RV size and EF.

4. Normal atrial size.

5. No significant valvular dysfunction.

6. No pericardial effusion.

7. Normal pulmonary artery systolic pressure.





______________________________

Denise Buenrostro MD



DR:  MB/melina  JOB# [id number]  [id number]

DD:  01/13/2017 14:00  DT:  01/13/2017 17:07

## 2018-02-20 ENCOUNTER — HOSPITAL ENCOUNTER (OUTPATIENT)
Dept: HOSPITAL 65 - RAD | Age: 58
Discharge: HOME | End: 2018-02-20
Attending: NURSE PRACTITIONER
Payer: COMMERCIAL

## 2018-02-20 DIAGNOSIS — Z12.31: Primary | ICD-10-CM

## 2018-02-20 PROCEDURE — 77067 SCR MAMMO BI INCL CAD: CPT

## 2018-02-20 NOTE — DIREP
PROCEDURE:Digital Screening Mammogram

 

TECHNIQUE:MLO and CC digital images of each breast are provided.  Computer 

Assisted Detection (CAD) was utilized.   

 

COMPARISON:[facility], , MAMMO BILATERAL DIAGNOSTIC, 

10/15/2015, 10:50 AM.

 

INDICATIONS:SCEENING

 

BREAST COMPOSITION:The breasts are extremely dense, which lowers the 

sensitivity of mammography.

 

FINDINGS:There are no grouped microcalcifications, masses, or architectural 

distortions to suggest malignancy.  There is no significant change as compared 

with the previous examination(s).   

 

IMPRESSION:No mammographic evidence of malignancy.   

 

RECOMMENDATIONS:Routine Screening Mammography per American College of 

Radiology guidelines.    

 

 

OVERALL FINAL ASSESSMENT:BI-RADS 1 - Negative Mammogram

Note:  This facility participates in a mammography screening patient reminder 

system.

 

 

 

Dictated by: Mati Pablo DO on 02/20/2018 at 02:24 PM     

Electronically Signed By: Mati Pablo DO on 02/20/2018 at 02:25 PM

## 2019-09-18 ENCOUNTER — HOSPITAL ENCOUNTER (OUTPATIENT)
Dept: HOSPITAL 65 - RAD | Age: 59
Discharge: HOME | End: 2019-09-18
Attending: NURSE PRACTITIONER
Payer: COMMERCIAL

## 2019-09-18 DIAGNOSIS — I10: ICD-10-CM

## 2019-09-18 DIAGNOSIS — R05: Primary | ICD-10-CM

## 2019-09-18 PROCEDURE — 71046 X-RAY EXAM CHEST 2 VIEWS: CPT

## 2019-09-18 NOTE — DIREP
PROCEDURE:CHEST 2 VIEWS

 

COMPARISON:None.

 

INDICATIONS:R05 COUGH

 

FINDINGS:

LUNGS/PLEURA:No significant pulmonary parenchymal abnormalities. No effusions.

VASCULATURE:Normal.  Unremarkable pulmonary vasculature.

CARDIAC:Normal.  No cardiac silhouette abnormality or cardiomegaly. 

MEDIASTINUM:Normal.  No visible mass or adenopathy. 

BONES:Normal.  No fracture or visible bony lesion. 

OTHER:Negative.  

 

CONCLUSION:Normal examination.  

 

 

 

Dictated by: Mariusz Glasgow Jr. on 09/18/2019 at 12:45 PM     

Electronically Signed By: Mariusz Glasgow Jr. on 09/18/2019 at 12:46 PM

## 2020-07-31 ENCOUNTER — HOSPITAL ENCOUNTER (OUTPATIENT)
Dept: HOSPITAL 65 - RAD | Age: 60
Discharge: HOME | End: 2020-07-31
Attending: NURSE PRACTITIONER
Payer: COMMERCIAL

## 2020-07-31 DIAGNOSIS — E04.9: Primary | ICD-10-CM

## 2020-07-31 PROCEDURE — 76536 US EXAM OF HEAD AND NECK: CPT

## 2020-07-31 NOTE — DIREP
PROCEDURE:US THYROID

 

TECHNIQUE:Thyroid ultrasound was performed with a high-frequency transducer.

 

COMPARISON:None.

 

INDICATIONS:E04.9 NONTOXIC GOITER

 

FINDINGS:

THYROID OVERVIEW

Right lobe - size: 4.2 x 1.8 x 1.5 cm, homogeneous, 3 nodules

Isthmus - size: 0.2 cm, homogeneous, 0 nodules

Left lobe - size: 3.3 x 1.7 x 1.3 cm, homogeneous, 1 nodule

 

 

THYROID NODULES

Right Lobe, #1, Middle 1/3, size: 0.6 x 0.3 x 0.4 cm, 

     Description: spongiform, isoechoic, wider-than-tall, smooth

     TI-RADS: 1, Recommendation: No F/U or FNA required

 

Right Lobe, #2, Middle 1/3, size: 0.6 x 0.4 x 0.5 cm, 

     Description: spongiform, isoechoic, wider-than-tall, smooth

     TI-RADS: 1, Recommendation: No F/U or FNA required

 

Right Lobe, #3, Lower 1/3, size: 0.8 x 0.4 x 0.7 cm, 

     Description: solid or almost completely solid, isoechoic, wider-than-tall, 

smooth

     TI-RADS: 3, Recommendation: does not meet size criteria for F/U or FNA

 

Left Lobe, #1, Middle 1/3, size: 0.5 x 0.4 x 0.4 cm, 

     Description: spongiform, isoechoic, wider-than-tall, smooth

     TI-RADS: 1, Recommendation: No F/U or FNA required

 

 

 

TI-RADS: 

1 = Benign, 2 = Not Suspicious, 3 = Mildly Suspicious, 4 = Moderately 

Suspicious, 5 = Highly Suspicious

 

Reference: 2017 JACR, ACR Thyroid Imaging, Reporting and Data System (TIRADS): 

White Paper of the ACR TI-RADS Committee.

 

CONCLUSION:

1.  Nonenlarged thyroid gland with tiny sub cm nodules bilaterally.  These do 

not meet criteria for fine-needle aspiration or required follow-up.  Additional 

follow-up can be obtained as clinically warranted.

 

 

 

Dictated by: CHAPARRITA Physician on 07/31/2020 at 10:34 AM     

Electronically Signed By: Jerome Ascencio M.D. on 07/31/2020 at 11:04 AM

## 2020-11-09 ENCOUNTER — HOSPITAL ENCOUNTER (OUTPATIENT)
Dept: HOSPITAL 65 - NPLAB | Age: 60
Discharge: HOME | End: 2020-11-09
Attending: NURSE PRACTITIONER
Payer: COMMERCIAL

## 2020-11-09 DIAGNOSIS — Z03.818: Primary | ICD-10-CM

## 2020-11-09 DIAGNOSIS — Z20.828: ICD-10-CM

## 2020-11-09 PROCEDURE — U0003 INFECTIOUS AGENT DETECTION BY NUCLEIC ACID (DNA OR RNA); SEVERE ACUTE RESPIRATORY SYNDROME CORONAVIRUS 2 (SARS-COV-2) (CORONAVIRUS DISEASE [COVID-19]), AMPLIFIED PROBE TECHNIQUE, MAKING USE OF HIGH THROUGHPUT TECHNOLOGIES AS DESCRIBED BY CMS-2020-01-R: HCPCS

## 2021-06-17 ENCOUNTER — HOSPITAL ENCOUNTER (OUTPATIENT)
Dept: HOSPITAL 65 - RAD | Age: 61
Discharge: HOME | End: 2021-06-17
Payer: COMMERCIAL

## 2021-06-17 DIAGNOSIS — M47.812: ICD-10-CM

## 2021-06-17 DIAGNOSIS — M48.07: Primary | ICD-10-CM

## 2021-06-17 DIAGNOSIS — Z90.49: ICD-10-CM

## 2021-06-17 DIAGNOSIS — M25.78: ICD-10-CM

## 2021-06-17 DIAGNOSIS — M47.817: ICD-10-CM

## 2021-06-17 PROCEDURE — 72100 X-RAY EXAM L-S SPINE 2/3 VWS: CPT

## 2021-06-17 PROCEDURE — 72040 X-RAY EXAM NECK SPINE 2-3 VW: CPT

## 2021-07-22 ENCOUNTER — HOSPITAL ENCOUNTER (OUTPATIENT)
Dept: HOSPITAL 65 - RAD | Age: 61
Discharge: HOME | End: 2021-07-22
Attending: NURSE PRACTITIONER
Payer: COMMERCIAL

## 2021-07-22 DIAGNOSIS — M54.2: Primary | ICD-10-CM

## 2021-07-22 NOTE — DIREP
PROCEDURE:XRAY ELBOW 2VWS-LT

 

COMPARISON:None.

 

INDICATIONS:M54.2 CERVICALGIA

 

FINDINGS:

BONES:Normal.

JOINTS:Normal. No displaced anterior or posterior fat pads.

SOFT TISSUES:Normal. 

OTHER:Normal.

 

CONCLUSION:

1.  No fracture, hemarthrosis, or arthritic changes are seen.  

 

Dictated by: Manuel Kumar M.D. on 07/22/2021 at 11:18 AM     

Electronically Signed By: Manuel Kumar M.D. on 07/22/2021 at 11:19 AM

## 2022-10-26 ENCOUNTER — HOSPITAL ENCOUNTER (OUTPATIENT)
Dept: HOSPITAL 65 - RAD | Age: 62
Discharge: HOME | End: 2022-10-26
Attending: NURSE PRACTITIONER
Payer: COMMERCIAL

## 2022-10-26 DIAGNOSIS — M85.88: ICD-10-CM

## 2022-10-26 DIAGNOSIS — Z79.3: ICD-10-CM

## 2022-10-26 DIAGNOSIS — E04.2: Primary | ICD-10-CM

## 2022-10-26 PROCEDURE — 76536 US EXAM OF HEAD AND NECK: CPT

## 2022-10-26 PROCEDURE — 77080 DXA BONE DENSITY AXIAL: CPT

## 2022-10-26 NOTE — DIREP
PROCEDURE:BONE DENSITY PERIPHERAL

COMPARISON:

COMPARISON:

 

INDICATIONS:Z79.3 LONG TERM (CURRENT) USE OF HORMONAL CONTRACEPTIVES

COMPARISON:

FINDINGS: 

 

LUMBAR SPINE

ALIGNMENT:Normal.  

SURGERY:No evidence of prior surgery is identified. 

DISKS:Normal. 

VERTEBRAE:Normal.

 

LEFT HIP

SURGERY:No evidence of prior surgery is identified. 

No FRAX data available.

 

 

 

 

 

 

SUMMARY

      Region         BMD(g/cm^2)    T-Score Classification  

==========================================================

    AP Spine(L1-L4)        1.165       -0.1     Normal      

 Femoral Neck(Left)        0.907       -0.9     Normal      

    Total Hip(Left)        0.905       -0.8     Normal      

Femoral Neck(Right)        0.899       -1.0     Normal      

   Total Hip(Right)        0.953       -0.4     Normal      

 

CONCLUSION:

1.  Normal bone mineral density of the lumbar spine.

2.  Normal bone mineral density of the left femoral neck.

3.  Normal bone mineral density of the left hip overall.

4.  Normal bone mineral density of the right femoral neck.

5.  Normal bone mineral density of the right hip overall.

 

 

Dictated by: Sunday Collins M.D. on 10/26/2022 at 01:13 PM     

Electronically Signed By: Sunday Collins M.D. on 10/26/2022 at 01:22 PM

## 2022-10-26 NOTE — DIREP
PROCEDURE:US THYROID

 

TECHNIQUE:Thyroid ultrasound was performed with a high-frequency transducer.

 

COMPARISON:[facility], [place], US THYROID, 07/31/2020, 08:46 AM.

 

INDICATIONS:NONTOXIC SINGLE THYROID NODULE

 

FINDINGS:

THYROID OVERVIEW

Right lobe - size: 5.4 x 1.8 x 1.5 cm, homogeneous, 3 nodules

Isthmus - size: 0.1 cm, homogeneous, 0 nodules

Left lobe - size: 4.2 x 1.6 x 1.0 cm, homogeneous, 1 nodule

 

THYROID NODULES

Right Lobe, #1, Upper 1/3, size: 0.6 x 0.3 x 0.5 cm, prior size: 0.6 x 0.3 x 

0.4 cm 

     Description: mixed cystic and solid, isoechoic, wider-than-tall, smooth

     TI-RADS: 2, Recommendation: No F/U or FNA required

 

Right Lobe, #2, Middle 1/3, size: 0.6 x 0.4 x 0.6 cm, prior size: 0.6 x 0.4 x 

0.5 cm 

     Description: almost completely cystic, anechoic, wider-than-tall, smooth

     TI-RADS: 1, Recommendation: No F/U or FNA required

 

Right Lobe, #3, Lower 1/3, size: 0.9 x 0.5 x 0.8 cm, prior size: 0.8 x 0.4 x 

0.7 cm 

     Description: solid or almost completely solid, isoechoic, wider-than-tall, 

smooth

     TI-RADS: 3, Recommendation: does not meet size criteria for F/U or FNA

 

Left Lobe, #1, Middle 1/3, size: 0.5 x 0.3 x 0.3 cm, prior size: 0.5 x 0.4 x 

0.4 cm 

     Description: mixed cystic and solid, isoechoic, wider-than-tall, smooth, 

large comet-tail artifacts

     TI-RADS: 2, Recommendation: No F/U or FNA required

 

TI-RADS: 

1 = Benign, 2 = Not Suspicious, 3 = Mildly Suspicious, 4 = Moderately 

Suspicious, 5 = Highly Suspicious

 

Reference: 2017 JACR, ACR Thyroid Imaging, Reporting and Data System (TIRADS): 

White Paper of the ACR TI-RADS Committee.

 

OTHER:No additional findings.

 

CONCLUSION:

1.  3 right thyroid nodules are 1 left thyroid nodule.

2.  Based on TI-RADS criteria, no FNA or additional follow-up is required.

 

 

 

Dictated by: CHAPARRITA Physician on 10/26/2022 at 06:11 PM     

Electronically Signed By: Sunday Collins M.D. on 10/26/2022 at 07:25 PM   

   

 

 

ac